# Patient Record
Sex: MALE | Race: WHITE | HISPANIC OR LATINO | Employment: FULL TIME | RURAL
[De-identification: names, ages, dates, MRNs, and addresses within clinical notes are randomized per-mention and may not be internally consistent; named-entity substitution may affect disease eponyms.]

---

## 2022-10-10 RX ORDER — SILDENAFIL 50 MG/1
TABLET, FILM COATED ORAL
Qty: 17 TABLET | Refills: 0 | OUTPATIENT
Start: 2022-10-10

## 2022-10-10 RX ORDER — FINASTERIDE 5 MG/1
TABLET, FILM COATED ORAL
Qty: 30 TABLET | Refills: 0 | OUTPATIENT
Start: 2022-10-10

## 2022-10-24 RX ORDER — FINASTERIDE 5 MG/1
TABLET, FILM COATED ORAL
Qty: 30 TABLET | Refills: 0 | OUTPATIENT
Start: 2022-10-24

## 2022-10-24 RX ORDER — SILDENAFIL 50 MG/1
TABLET, FILM COATED ORAL
Qty: 17 TABLET | Refills: 0 | OUTPATIENT
Start: 2022-10-24

## 2022-11-23 ENCOUNTER — TELEPHONE (OUTPATIENT)
Dept: FAMILY MEDICINE CLINIC | Facility: CLINIC | Age: 57
End: 2022-11-23

## 2022-11-23 NOTE — TELEPHONE ENCOUNTER
Caller: George Staley    Relationship: Self    Best call back number:    [phone]    Requested Prescriptions:     FINASTERIDE - 5 MG     SILDENAFIL - 50 MG    Pharmacy where request should be sent:      [facility] - [place], [place]    TELEPHONE CONTACT:    [phone]    Additional details provided by patient:     PATIENT STATED HE IS COMPLETELY OUT OF THE MEDICATIONS    Does the patient have less than a 3 day supply:  [x] Yes  [] No    Jed Jones Rep   11/23/22 15:36 EST     DR LEONARD DUMONT

## 2022-11-28 RX ORDER — SILDENAFIL 50 MG/1
TABLET, FILM COATED ORAL
Qty: 17 TABLET | Refills: 1 | Status: SHIPPED | OUTPATIENT
Start: 2022-11-28 | End: 2023-02-17 | Stop reason: SDUPTHER

## 2022-11-28 RX ORDER — FINASTERIDE 5 MG/1
TABLET, FILM COATED ORAL
Qty: 30 TABLET | Refills: 1 | Status: SHIPPED | OUTPATIENT
Start: 2022-11-28 | End: 2023-02-17 | Stop reason: SDUPTHER

## 2022-11-28 RX ORDER — SILDENAFIL 50 MG/1
50 TABLET, FILM COATED ORAL DAILY PRN
Qty: 17 TABLET | Refills: 1 | Status: SHIPPED | OUTPATIENT
Start: 2022-11-28 | End: 2023-02-17 | Stop reason: SDUPTHER

## 2022-11-28 RX ORDER — FINASTERIDE 5 MG/1
5 TABLET, FILM COATED ORAL DAILY
Qty: 30 TABLET | Refills: 2 | Status: SHIPPED | OUTPATIENT
Start: 2022-11-28 | End: 2023-02-17 | Stop reason: SDUPTHER

## 2022-11-28 NOTE — TELEPHONE ENCOUNTER
Patient has an upcoming appointment on 02/17/2023    Will send enough refills until that appointment

## 2023-02-17 ENCOUNTER — OFFICE VISIT (OUTPATIENT)
Dept: FAMILY MEDICINE CLINIC | Facility: CLINIC | Age: 58
End: 2023-02-17
Payer: COMMERCIAL

## 2023-02-17 VITALS
RESPIRATION RATE: 14 BRPM | DIASTOLIC BLOOD PRESSURE: 72 MMHG | WEIGHT: 180.38 LBS | BODY MASS INDEX: 31.96 KG/M2 | OXYGEN SATURATION: 99 % | HEART RATE: 77 BPM | SYSTOLIC BLOOD PRESSURE: 120 MMHG | HEIGHT: 63 IN | TEMPERATURE: 97.8 F

## 2023-02-17 DIAGNOSIS — R00.2 PALPITATION: ICD-10-CM

## 2023-02-17 DIAGNOSIS — J30.1 SEASONAL ALLERGIC RHINITIS DUE TO POLLEN: ICD-10-CM

## 2023-02-17 DIAGNOSIS — Z00.00 ROUTINE GENERAL MEDICAL EXAMINATION AT A HEALTH CARE FACILITY: Primary | ICD-10-CM

## 2023-02-17 DIAGNOSIS — Z12.5 SPECIAL SCREENING, PROSTATE CANCER: ICD-10-CM

## 2023-02-17 DIAGNOSIS — N52.9 VASCULOGENIC ERECTILE DYSFUNCTION, UNSPECIFIED VASCULOGENIC ERECTILE DYSFUNCTION TYPE: ICD-10-CM

## 2023-02-17 DIAGNOSIS — N40.0 BENIGN PROSTATIC HYPERPLASIA WITHOUT LOWER URINARY TRACT SYMPTOMS: ICD-10-CM

## 2023-02-17 DIAGNOSIS — E78.2 MIXED HYPERLIPIDEMIA: ICD-10-CM

## 2023-02-17 DIAGNOSIS — E66.09 CLASS 1 OBESITY DUE TO EXCESS CALORIES WITHOUT SERIOUS COMORBIDITY WITH BODY MASS INDEX (BMI) OF 31.0 TO 31.9 IN ADULT: ICD-10-CM

## 2023-02-17 DIAGNOSIS — D64.89 ANEMIA DUE TO OTHER CAUSE, NOT CLASSIFIED: ICD-10-CM

## 2023-02-17 PROCEDURE — 99396 PREV VISIT EST AGE 40-64: CPT | Performed by: INTERNAL MEDICINE

## 2023-02-17 PROCEDURE — 93000 ELECTROCARDIOGRAM COMPLETE: CPT | Performed by: INTERNAL MEDICINE

## 2023-02-17 PROCEDURE — 36415 COLL VENOUS BLD VENIPUNCTURE: CPT | Performed by: INTERNAL MEDICINE

## 2023-02-17 RX ORDER — TAMSULOSIN HYDROCHLORIDE 0.4 MG/1
1 CAPSULE ORAL DAILY
Qty: 30 CAPSULE | Refills: 5 | Status: SHIPPED | OUTPATIENT
Start: 2023-02-17

## 2023-02-17 RX ORDER — FINASTERIDE 5 MG/1
5 TABLET, FILM COATED ORAL DAILY
Qty: 30 TABLET | Refills: 2 | Status: SHIPPED | OUTPATIENT
Start: 2023-02-17

## 2023-02-17 RX ORDER — TAMSULOSIN HYDROCHLORIDE 0.4 MG/1
1 CAPSULE ORAL DAILY
COMMUNITY
End: 2023-02-17 | Stop reason: SDUPTHER

## 2023-02-17 RX ORDER — SILDENAFIL 50 MG/1
50 TABLET, FILM COATED ORAL AS NEEDED
Qty: 9 TABLET | Refills: 1 | Status: SHIPPED | OUTPATIENT
Start: 2023-02-17

## 2023-02-17 NOTE — ASSESSMENT & PLAN NOTE
Modest increase in weight pattern since last visit, but his BMI does overestimate his weight pattern, he appears to be at most 10 to 15 pounds overweight, he is very active and healthy.  Nonetheless reinforced importance of healthy diet, activity level.

## 2023-02-17 NOTE — ASSESSMENT & PLAN NOTE
Mild and seasonal pattern typically responsive to over-the-counter antihistamine when used.  Additional benefit of saline spray, nasal flushing.  Advise concerns.

## 2023-02-17 NOTE — ASSESSMENT & PLAN NOTE
No concerns of libido, with notable normal total testosterone of 367 on 8/31/2021.  Good response to previous older female, refill provided for sildenafil 50 mg daily as needed for sexual activity, to be given approximately 30 min. prior.  Advise if not improving.

## 2023-02-17 NOTE — PROGRESS NOTES
Venipuncture Blood Specimen Collection  Venipuncture performed in left arm by Denise Bass MA with good hemostasis. Patient tolerated the procedure well without complications.   02/17/23   Denise Bass MA

## 2023-02-17 NOTE — ASSESSMENT & PLAN NOTE
Last full blood work 8/31/2021, repeat ordered today 2/17/2023.  EGD by Dr. Hyatt 8/22/2018 showing gastric biopsy with chronic active H.  Pylori infection, and gastroesophageal junction with squamous mucosa with mild changes suggestive of GERD, negative for intestinal metaplasia.  colonoscopy 8/22/2018 notable hemorrhoids but all else is normal, 10 year followup recommended.  Childhood vaccinations given, b with Tdap given 2/4/2019.

## 2023-02-17 NOTE — ASSESSMENT & PLAN NOTE
Notable for 12/8/2017 blood work with hemoglobin 14.7 which is normal, hematocrit slightly low of 40.6 with normal range MCV 83.  Recheck with blood work 6/11/2018 including CBC with differential, hemoglobin and hematocrit 13.6/39.9, additional investigations included a normal reticulocyte count at 1.3%.  Ferritin level at 29 with range 8-388.  LDH normal at 169 with range 100-1 90.  Iron level 45 with range .  TIBC of 313 with range 250-450.  Percent saturation of 14% with range 15-55 percent.  Vitamin B12 level normal at 518 with range 193-986.  Folate of 19.5 with range 8.6-58.9.. He was referred for colonoscopy, which was reassuring on 8/22/2018 and an EGD showing chronic active H. pylori infection which was treated, GE junction with squamous mucosa but negative for intestinal metaplasia, consistent with reflux.  Reflux symptoms are resolved status post treatment of H. pylori.  Recheck blood work 8/31/2021 showed modestly improved hemoglobin 14.2, hematocrit 41.5, MCV 86.1.  Recheck pending.

## 2023-02-17 NOTE — PATIENT INSTRUCTIONS

## 2023-02-17 NOTE — ASSESSMENT & PLAN NOTE
Pattern consistent with benign prostatic hypertrophy, for which he was placed on finasteride 5 mg daily per previous physician, Dr. Horacio Watkins at [facility] in approximately 2016.  Normal PSA of 1.79 on 12/8/2017, 2.71 on 8/31/2021.  Continue finasteride 5 mg daily, Flomax 0.4 mg daily.  Recheck PSA pending.

## 2023-02-17 NOTE — ASSESSMENT & PLAN NOTE
Rare palpitation, no associated chest pain or shortness of breath with completely reassuring EKG 2/17/2023, none to compare prior.  No further evaluation necessary.

## 2023-02-17 NOTE — ASSESSMENT & PLAN NOTE
8/31/2021 total cholesterol 189, triglycerides 94, HDL 49, .  Comparison 12/8/2017 with total cholesterol 181, triglycerides 102, HDL 52, .  Modest increase, no indication for statin therapy.  Reinforced importance of healthy diet, exercise.  Recheck pending

## 2023-02-18 LAB
ALBUMIN SERPL-MCNC: 4.4 G/DL (ref 3.8–4.9)
ALBUMIN/GLOB SERPL: 2 {RATIO} (ref 1.2–2.2)
ALP SERPL-CCNC: 68 IU/L (ref 44–121)
ALT SERPL-CCNC: 24 IU/L (ref 0–44)
APPEARANCE UR: CLEAR
AST SERPL-CCNC: 17 IU/L (ref 0–40)
BACTERIA #/AREA URNS HPF: NORMAL /[HPF]
BASOPHILS # BLD AUTO: 0 X10E3/UL (ref 0–0.2)
BASOPHILS NFR BLD AUTO: 1 %
BILIRUB SERPL-MCNC: 0.6 MG/DL (ref 0–1.2)
BILIRUB UR QL STRIP: NEGATIVE
BUN SERPL-MCNC: 17 MG/DL (ref 6–24)
BUN/CREAT SERPL: 16 (ref 9–20)
CALCIUM SERPL-MCNC: 9.2 MG/DL (ref 8.7–10.2)
CASTS URNS QL MICRO: NORMAL /LPF
CHLORIDE SERPL-SCNC: 104 MMOL/L (ref 96–106)
CHOLEST SERPL-MCNC: 191 MG/DL (ref 100–199)
CO2 SERPL-SCNC: 23 MMOL/L (ref 20–29)
COLOR UR: YELLOW
CREAT SERPL-MCNC: 1.04 MG/DL (ref 0.76–1.27)
EGFRCR SERPLBLD CKD-EPI 2021: 83 ML/MIN/1.73
EOSINOPHIL # BLD AUTO: 0.1 X10E3/UL (ref 0–0.4)
EOSINOPHIL NFR BLD AUTO: 2 %
EPI CELLS #/AREA URNS HPF: NORMAL /HPF (ref 0–10)
ERYTHROCYTE [DISTWIDTH] IN BLOOD BY AUTOMATED COUNT: 12.5 % (ref 11.6–15.4)
GLOBULIN SER CALC-MCNC: 2.2 G/DL (ref 1.5–4.5)
GLUCOSE SERPL-MCNC: 99 MG/DL (ref 70–99)
GLUCOSE UR QL STRIP: NEGATIVE
HBA1C MFR BLD: 5.7 % (ref 4.8–5.6)
HCT VFR BLD AUTO: 43.4 % (ref 37.5–51)
HCV IGG SERPL QL IA: NON REACTIVE
HDLC SERPL-MCNC: 50 MG/DL
HGB BLD-MCNC: 15.3 G/DL (ref 13–17.7)
HGB UR QL STRIP: NEGATIVE
HIV 1+2 AB+HIV1 P24 AG SERPL QL IA: NON REACTIVE
IMM GRANULOCYTES # BLD AUTO: 0.1 X10E3/UL (ref 0–0.1)
IMM GRANULOCYTES NFR BLD AUTO: 1 %
KETONES UR QL STRIP: NEGATIVE
LDLC SERPL CALC-MCNC: 119 MG/DL (ref 0–99)
LEUKOCYTE ESTERASE UR QL STRIP: NEGATIVE
LYMPHOCYTES # BLD AUTO: 1.7 X10E3/UL (ref 0.7–3.1)
LYMPHOCYTES NFR BLD AUTO: 33 %
MCH RBC QN AUTO: 30.6 PG (ref 26.6–33)
MCHC RBC AUTO-ENTMCNC: 35.3 G/DL (ref 31.5–35.7)
MCV RBC AUTO: 87 FL (ref 79–97)
MICRO URNS: NORMAL
MICRO URNS: NORMAL
MONOCYTES # BLD AUTO: 0.3 X10E3/UL (ref 0.1–0.9)
MONOCYTES NFR BLD AUTO: 6 %
NEUTROPHILS # BLD AUTO: 2.9 X10E3/UL (ref 1.4–7)
NEUTROPHILS NFR BLD AUTO: 57 %
NITRITE UR QL STRIP: NEGATIVE
PH UR STRIP: 6 [PH] (ref 5–7.5)
PLATELET # BLD AUTO: 158 X10E3/UL (ref 150–450)
POTASSIUM SERPL-SCNC: 4.7 MMOL/L (ref 3.5–5.2)
PROT SERPL-MCNC: 6.6 G/DL (ref 6–8.5)
PROT UR QL STRIP: NEGATIVE
PSA SERPL-MCNC: 2.4 NG/ML (ref 0–4)
RBC # BLD AUTO: 5 X10E6/UL (ref 4.14–5.8)
RBC #/AREA URNS HPF: NORMAL /HPF (ref 0–2)
SODIUM SERPL-SCNC: 138 MMOL/L (ref 134–144)
SP GR UR STRIP: 1.02 (ref 1–1.03)
TRIGL SERPL-MCNC: 121 MG/DL (ref 0–149)
TSH SERPL DL<=0.005 MIU/L-ACNC: 1.35 UIU/ML (ref 0.45–4.5)
URINALYSIS REFLEX: NORMAL
UROBILINOGEN UR STRIP-MCNC: 0.2 MG/DL (ref 0.2–1)
VLDLC SERPL CALC-MCNC: 22 MG/DL (ref 5–40)
WBC # BLD AUTO: 5 X10E3/UL (ref 3.4–10.8)
WBC #/AREA URNS HPF: NORMAL /HPF (ref 0–5)

## 2023-02-20 ENCOUNTER — TELEPHONE (OUTPATIENT)
Dept: FAMILY MEDICINE CLINIC | Facility: CLINIC | Age: 58
End: 2023-02-20
Payer: COMMERCIAL

## 2023-02-20 NOTE — TELEPHONE ENCOUNTER
----- Message from Yeyo Lacey MD sent at 2/20/2023  8:10 AM EST -----  Please speak the patient regarding blood work results from 2/17/2023.  Notable results as follows:    Hemoglobin A1c is diabetic screen just into prediabetic range at 5.7%.  Total cholesterol 191, triglycerides 121, HDL 50, .  Comparison 8/31/2021 with total cholesterol 189, triglycerides 94, HDL 49, .  HIV and hepatitis C virus screening both negative.  PSA normal at 2.4, slightly improved and decreased from 2.71 on 8/31/2021, reassuring pattern.  TSH normal at 1.350.  CMP with normal electrolytes, notable glucose 99, kidney function with good creatinine 1.04, GFR 83.  Normal proteins, normal liver function test.  CBC with differential with normal blood counts.  Urinalysis negative and nonconcerning.    Regarding prediabetic pattern which is new diagnosis, reinforced importance of healthy diet, exercise and weight loss and recheck hemoglobin A1c in clinic at 6-month follow-up visit.  Regarding modest increase in cholesterol, similar to before, reinforcing again importance of healthy diet, exercise, weight loss.

## 2023-02-20 NOTE — TELEPHONE ENCOUNTER
Spoke to patient in regards to labs he did express understanding of no more cokes eat healthier he will see us in 6 months.

## 2023-02-27 ENCOUNTER — TELEPHONE (OUTPATIENT)
Dept: FAMILY MEDICINE CLINIC | Facility: CLINIC | Age: 58
End: 2023-02-27
Payer: COMMERCIAL

## 2023-02-27 NOTE — TELEPHONE ENCOUNTER
Caller: CLAUDE HUFFMAN     Relationship: DAUGHTER     Best call back number: [phone]    What is your medical concern? WANTS TO SPEAK TO YOU REGARDING PATIENTS LAST VISIT.02/17/23    SHE IS NOT ON A BH VERBAL  HOWEVER HE SPOKE TO ME AND WANTS YOU TO TALK WITH HIS DAUGHTER.

## 2023-06-12 DIAGNOSIS — N52.9 VASCULOGENIC ERECTILE DYSFUNCTION, UNSPECIFIED VASCULOGENIC ERECTILE DYSFUNCTION TYPE: ICD-10-CM

## 2023-06-13 RX ORDER — SILDENAFIL 50 MG/1
TABLET, FILM COATED ORAL
Qty: 9 TABLET | Refills: 1 | Status: SHIPPED | OUTPATIENT
Start: 2023-06-13

## 2023-07-21 NOTE — PROGRESS NOTES
Male Physical Note      Date: 2023   Patient Name: George Staley  : 1965   MRN: [id number]     Chief Complaint   Patient presents with   • Annual Exam       History of Present Illness: George Staley is a 57 y.o. male who is here today for their annual health maintenance and physical, and for follow-up regarding multimedical problems.  Regarding benign prostatic hypertrophy, he continues on finasteride and Flomax with benefit of nighttime urinary pattern.  Regarding erectile dysfunction with normal testosterone level, benefit of sildenafil with as needed use for sexual activity.  He overall has good sexual interest and libido. Regarding previous anemia pattern, he was referred for gastrointestinal evaluation in 2018 and had colonoscopy which is reassuring and EGD showing chronic H. pylori infection for which he was treated and has clinically had resolution of any reflux type symptoms.  Monitor with blood work.  Regarding cholesterol, we discussed need for improved diet and exercise, and it's time to recheck his cholesterol at this time.  Previous reflux symptoms have resolved after he a treatment of H. pylori in 2018.  Mild seasonal allergy symptoms do well with rare use of over-the-counter antihistamine.  Rare palpitation, no associated chest pain or shortness of breath, very transient without clear trigger, no increased use of caffeine    Subjective      Review of Systems    Past Medical History, Social History, Family History and Care Team were all reviewed with patient and updated as appropriate.       Current Outpatient Medications:   •  finasteride (Proscar) 5 MG tablet, Take 1 tablet by mouth Daily., Disp: 30 tablet, Rfl: 2  •  sildenafil (VIAGRA) 50 MG tablet, Take 1 tablet by mouth As Needed for Erectile Dysfunction., Disp: 9 tablet, Rfl: 1  •  tamsulosin (FLOMAX) 0.4 MG capsule 24 hr capsule, Take 1 capsule by mouth Daily., Disp: 30 capsule, Rfl: 5    No Known  Allergies    Immunization History   Administered Date(s) Administered   • COVID-19 (PFIZER) PURPLE CAP 08/31/2021, 09/21/2021   • Tdap 02/04/2019        Health Maintenance Summary          Overdue - ZOSTER VACCINE (1 of 2) Overdue - never done    No completion, postpone, or frequency change history exists for this topic.          Overdue - COVID-19 Vaccine (3 - Booster for Pfizer series) Overdue since 11/16/2021 09/21/2021  Imm Admin: COVID-19 (PFIZER) PURPLE CAP    08/31/2021  Imm Admin: COVID-19 (PFIZER) PURPLE CAP          Overdue - INFLUENZA VACCINE (Yearly - August to March) Overdue - never done    No completion, postpone, or frequency change history exists for this topic.          Ordered - HEPATITIS C SCREENING (Once) Ordered on 2/17/2023    No completion, postpone, or frequency change history exists for this topic.          Overdue - ANNUAL PHYSICAL (Yearly) Overdue - never done    No completion, postpone, or frequency change history exists for this topic.          Ordered - LIPID PANEL (Yearly) Ordered on 2/17/2023    No completion, postpone, or frequency change history exists for this topic.          COLORECTAL CANCER SCREENING (COLONOSCOPY - Every 10 Years) Next due on 8/22/2028 08/22/2018  COLONOSCOPY (Done - 8/22/2018 colonoscopy by Dr. Hyatt with hemorrhoids but otherwise negative, 10-year follow-up recommended)          TDAP/TD VACCINES (2 - Td or Tdap) Next due on 2/4/2029 02/04/2019  Imm Admin: Tdap          Pneumococcal Vaccine 0-64 (Series Information) Aged Out    No completion, postpone, or frequency change history exists for this topic.                Colorectal Screening:   Colonoscopy 8/22/2018 with 10-year follow-up  Last Completed Colonoscopy          COLORECTAL CANCER SCREENING (COLONOSCOPY - Every 10 Years) Next due on 8/22/2028 08/22/2018  COLONOSCOPY (Done - 8/22/2018 colonoscopy by Dr. Hyatt with hemorrhoids but otherwise negative, 10-year follow-up recommended)               CT for Smoker (Age 50-80, 20pk yr within last 15 years): Not applicable  Hep C (Age 18-79 once): Pending with blood work  HIV (Age 15-65 once) : Pending with blood work  PSA (Over age 50, C Level Recommendation): No results found for: PSA  US Aorta (For male smokers, age 65): Not applicable  A1c: No results found for: HGBA1C  Lipid panel: No results found for: LIPIDEXCLUSI    The ASCVD Risk score (Deondre WOMACK, et al., 2019) failed to calculate for the following reasons:    Cannot find a previous HDL lab    Cannot find a previous total cholesterol lab      Tobacco Use: Low Risk    • Smoking Tobacco Use: Never   • Smokeless Tobacco Use: Never   • Passive Exposure: Not on file       Social History     Substance and Sexual Activity   Alcohol Use Never        Social History     Substance and Sexual Activity   Drug Use Never        Diet/Physical activity: Ongoing attempts to improve dietary intake with still good activity level through work    Sexual health:  Social History     Substance and Sexual Activity   Sexual Activity Yes       PHQ-2 Depression Screening  PHQ-9 Total Score: 0       Intimate partner violence: (Screen on initial visit, older adult with injury or evidence of neglect):  • Violence can be a problem in many people's lives, so I now ask every patient about trauma or abuse they may have experienced in a relationship.  • Stress/Safety - Do you feel safe in your relationship?  • Afraid/Abused - Have you ever been in a relationship where you were threatened, hurt, or afraid?  • Friend/Family - Are your friends aware you have been hurt?  • Emergency Plan - Do you have a safe place to go and the resources you need in an emergency?    Osteoporosis:   • Men: history of low trauma fracture, androgen deprivation therapy for prostate cancer, hypogonadism, primary hyperparathyroidism, intestinal disorders.     Objective     Physical Exam:  Vitals:    02/17/23 0921   BP: 120/72   BP Location: Right arm  "  Patient Position: Sitting   Cuff Size: Adult   Pulse: 77   Resp: 14   Temp: 97.8 °F (36.6 °C)   TempSrc: Temporal   SpO2: 99%   Weight: 81.8 kg (180 lb 6 oz)   Height: 160 cm (63\")     Body mass index is 31.95 kg/m².     Physical Exam  Constitutional:       General: He is not in acute distress.     Appearance: Normal appearance. He is obese. He is not ill-appearing, toxic-appearing or diaphoretic.   HENT:      Head: Normocephalic and atraumatic.      Right Ear: Tympanic membrane, ear canal and external ear normal.      Left Ear: Tympanic membrane, ear canal and external ear normal.      Nose: Nose normal. No rhinorrhea.      Mouth/Throat:      Mouth: Mucous membranes are moist.      Pharynx: Oropharynx is clear. No oropharyngeal exudate or posterior oropharyngeal erythema.   Eyes:      Extraocular Movements: Extraocular movements intact.      Conjunctiva/sclera: Conjunctivae normal.      Pupils: Pupils are equal, round, and reactive to light.   Neck:      Vascular: No carotid bruit.   Cardiovascular:      Rate and Rhythm: Normal rate and regular rhythm.      Pulses: Normal pulses.      Heart sounds: Normal heart sounds. No murmur heard.    No friction rub. No gallop.   Pulmonary:      Effort: Pulmonary effort is normal. No respiratory distress.      Breath sounds: Normal breath sounds. No stridor. No wheezing.   Abdominal:      General: Abdomen is flat. Bowel sounds are normal. There is no distension.      Palpations: Abdomen is soft. There is no mass.      Tenderness: There is no abdominal tenderness. There is no guarding or rebound.      Hernia: No hernia is present.   Genitourinary:     Penis: Normal.       Testes: Normal.      Prostate: Normal.      Rectum: Normal.      Comments: Full mildly enlarged prostate but smooth with no nodules or unusual palpation.  Normal rectal tone.  Negative hernia evaluation bilaterally.  Musculoskeletal:      Cervical back: Normal range of motion and neck supple. No tenderness. "      Right lower leg: No edema.      Left lower leg: No edema.   Lymphadenopathy:      Cervical: No cervical adenopathy.   Skin:     General: Skin is warm and dry.      Capillary Refill: Capillary refill takes less than 2 seconds.   Neurological:      General: No focal deficit present.      Mental Status: He is alert and oriented to person, place, and time. Mental status is at baseline.   Psychiatric:         Mood and Affect: Mood normal.         Behavior: Behavior normal.         Thought Content: Thought content normal.         Judgment: Judgment normal.           ECG 12 Lead    Date/Time: 2/17/2023 9:50 AM  Performed by: Yeyo Lacey MD  Authorized by: Yeyo Lacey MD   Comparison: not compared with previous ECG   Rhythm: sinus rhythm  Rate: normal  Conduction: conduction normal  QRS axis: normal  Other: no other findings    Clinical impression: normal ECG            Assessment / Plan      Assessment/Plan:   Diagnoses and all orders for this visit:    1. Routine general medical examination at a health care facility (Primary)  Assessment & Plan:  Last full blood work 8/31/2021, repeat ordered today 2/17/2023.  EGD by Dr. Hyatt 8/22/2018 showing gastric biopsy with chronic active H.  Pylori infection, and gastroesophageal junction with squamous mucosa with mild changes suggestive of GERD, negative for intestinal metaplasia.  colonoscopy 8/22/2018 notable hemorrhoids but all else is normal, 10 year followup recommended.  Childhood vaccinations given, b with Tdap given 2/4/2019.    Orders:  -     CBC & Differential; Future  -     Comprehensive Metabolic Panel; Future  -     Urinalysis With Culture If Indicated - Urine, Clean Catch; Future  -     Lipid Panel; Future  -     TSH Rfx On Abnormal To Free T4; Future  -     Hemoglobin A1c; Future  -     Hepatitis C Antibody; Future  -     HIV-1 / O / 2 Ag / Antibody 4th Generation; Future  -     HIV-1 / O / 2 Ag / Antibody 4th Generation  -     Hepatitis C Antibody  -      Hemoglobin A1c  -     TSH Rfx On Abnormal To Free T4  -     Lipid Panel  -     Urinalysis With Culture If Indicated - Urine, Clean Catch  -     Comprehensive Metabolic Panel  -     CBC & Differential    2. Seasonal allergic rhinitis due to pollen  Assessment & Plan:  Mild and seasonal pattern typically responsive to over-the-counter antihistamine when used.  Additional benefit of saline spray, nasal flushing.  Advise concerns.      3. Class 1 obesity due to excess calories without serious comorbidity with body mass index (BMI) of 31.0 to 31.9 in adult  Assessment & Plan:  Modest increase in weight pattern since last visit, but his BMI does overestimate his weight pattern, he appears to be at most 10 to 15 pounds overweight, he is very active and healthy.  Nonetheless reinforced importance of healthy diet, activity level.      4. Anemia due to other cause, not classified  Assessment & Plan:  Notable for 12/8/2017 blood work with hemoglobin 14.7 which is normal, hematocrit slightly low of 40.6 with normal range MCV 83.  Recheck with blood work 6/11/2018 including CBC with differential, hemoglobin and hematocrit 13.6/39.9, additional investigations included a normal reticulocyte count at 1.3%.  Ferritin level at 29 with range 8-388.  LDH normal at 169 with range 100-1 90.  Iron level 45 with range .  TIBC of 313 with range 250-450.  Percent saturation of 14% with range 15-55 percent.  Vitamin B12 level normal at 518 with range 193-986.  Folate of 19.5 with range 8.6-58.9.. He was referred for colonoscopy, which was reassuring on 8/22/2018 and an EGD showing chronic active H. pylori infection which was treated, GE junction with squamous mucosa but negative for intestinal metaplasia, consistent with reflux.  Reflux symptoms are resolved status post treatment of H. pylori.  Recheck blood work 8/31/2021 showed modestly improved hemoglobin 14.2, hematocrit 41.5, MCV 86.1.  Recheck pending.      5. Mixed  hyperlipidemia  Assessment & Plan:   8/31/2021 total cholesterol 189, triglycerides 94, HDL 49, .  Comparison 12/8/2017 with total cholesterol 181, triglycerides 102, HDL 52, .  Modest increase, no indication for statin therapy.  Reinforced importance of healthy diet, exercise.  Recheck pending      6. Vasculogenic erectile dysfunction, unspecified vasculogenic erectile dysfunction type  Assessment & Plan:  No concerns of libido, with notable normal total testosterone of 367 on 8/31/2021.  Good response to previous older female, refill provided for sildenafil 50 mg daily as needed for sexual activity, to be given approximately 30 min. prior.  Advise if not improving.    Orders:  -     sildenafil (VIAGRA) 50 MG tablet; Take 1 tablet by mouth As Needed for Erectile Dysfunction.  Dispense: 9 tablet; Refill: 1    7. Special screening, prostate cancer  -     PSA Screen; Future  -     PSA Screen    8. Palpitation  Assessment & Plan:  Rare palpitation, no associated chest pain or shortness of breath with completely reassuring EKG 2/17/2023, none to compare prior.  No further evaluation necessary.    Orders:  -     ECG 12 Lead    9. Benign prostatic hyperplasia without lower urinary tract symptoms  Assessment & Plan:  Pattern consistent with benign prostatic hypertrophy, for which he was placed on finasteride 5 mg daily per previous physician, Dr. Horacio Watkins at [facility] in approximately 2016.  Normal PSA of 1.79 on 12/8/2017, 2.71 on 8/31/2021.  Continue finasteride 5 mg daily, Flomax 0.4 mg daily.  Recheck PSA pending.    Orders:  -     finasteride (Proscar) 5 MG tablet; Take 1 tablet by mouth Daily.  Dispense: 30 tablet; Refill: 2  -     tamsulosin (FLOMAX) 0.4 MG capsule 24 hr capsule; Take 1 capsule by mouth Daily.  Dispense: 30 capsule; Refill: 5     Healthcare Maintenance:  Counseling provided based on age appropriate USPSTF guidelines.  BMI is >= 30 and <35.  (Class 1 Obesity). The following options were offered after discussion;: weight loss educational material (shared in after visit summary), exercise counseling/recommendations and nutrition counseling/recommendations      George Staley voices understanding and acceptance of this advice and will call back with any further questions or concerns. AVS with preventive healthcare tips printed for patient.     Follow Up:   Return in about 6 months (around 8/17/2023) for Next scheduled follow up.        Yeyo Lacey MD  [facility]   Patient requests all Lab, Cardiology, and Radiology Results on their Discharge Instructions

## 2023-08-02 DIAGNOSIS — N40.0 BENIGN PROSTATIC HYPERPLASIA WITHOUT LOWER URINARY TRACT SYMPTOMS: ICD-10-CM

## 2023-08-02 DIAGNOSIS — N52.9 VASCULOGENIC ERECTILE DYSFUNCTION, UNSPECIFIED VASCULOGENIC ERECTILE DYSFUNCTION TYPE: ICD-10-CM

## 2023-08-03 RX ORDER — TAMSULOSIN HYDROCHLORIDE 0.4 MG/1
1 CAPSULE ORAL DAILY
Qty: 30 CAPSULE | Refills: 3 | Status: SHIPPED | OUTPATIENT
Start: 2023-08-03

## 2023-08-03 RX ORDER — SILDENAFIL 50 MG/1
TABLET, FILM COATED ORAL
Qty: 9 TABLET | Refills: 1 | Status: SHIPPED | OUTPATIENT
Start: 2023-08-03

## 2023-08-03 RX ORDER — FINASTERIDE 5 MG/1
TABLET, FILM COATED ORAL
Qty: 30 TABLET | Refills: 3 | Status: SHIPPED | OUTPATIENT
Start: 2023-08-03

## 2023-08-18 ENCOUNTER — OFFICE VISIT (OUTPATIENT)
Dept: FAMILY MEDICINE CLINIC | Facility: CLINIC | Age: 58
End: 2023-08-18
Payer: COMMERCIAL

## 2023-08-18 VITALS
DIASTOLIC BLOOD PRESSURE: 70 MMHG | BODY MASS INDEX: 30.9 KG/M2 | RESPIRATION RATE: 18 BRPM | WEIGHT: 174.38 LBS | SYSTOLIC BLOOD PRESSURE: 110 MMHG | TEMPERATURE: 97 F | OXYGEN SATURATION: 99 % | HEIGHT: 63 IN

## 2023-08-18 DIAGNOSIS — R73.03 PREDIABETES: Primary | ICD-10-CM

## 2023-08-18 DIAGNOSIS — N52.9 VASCULOGENIC ERECTILE DYSFUNCTION, UNSPECIFIED VASCULOGENIC ERECTILE DYSFUNCTION TYPE: ICD-10-CM

## 2023-08-18 DIAGNOSIS — E78.2 MIXED HYPERLIPIDEMIA: ICD-10-CM

## 2023-08-18 DIAGNOSIS — E66.09 CLASS 1 OBESITY DUE TO EXCESS CALORIES WITHOUT SERIOUS COMORBIDITY WITH BODY MASS INDEX (BMI) OF 31.0 TO 31.9 IN ADULT: ICD-10-CM

## 2023-08-18 DIAGNOSIS — N40.0 BENIGN PROSTATIC HYPERPLASIA WITHOUT LOWER URINARY TRACT SYMPTOMS: ICD-10-CM

## 2023-08-18 DIAGNOSIS — J30.1 SEASONAL ALLERGIC RHINITIS DUE TO POLLEN: ICD-10-CM

## 2023-08-18 LAB
EXPIRATION DATE: NORMAL
GLUCOSE BLDC GLUCOMTR-MCNC: 132 MG/DL (ref 70–130)
HBA1C MFR BLD: 5.6 %
Lab: NORMAL

## 2023-08-18 RX ORDER — SILDENAFIL 100 MG/1
100 TABLET, FILM COATED ORAL DAILY PRN
Qty: 9 TABLET | Refills: 1 | Status: SHIPPED | OUTPATIENT
Start: 2023-08-18

## 2023-08-18 NOTE — ASSESSMENT & PLAN NOTE
New diagnosis with blood work 2/17/2023 with hemoglobin A1c of 5.7%.  With patient improving diet, less sugar intake, and 6 pound weight loss, hemoglobin A1c today improved to 5.6%.  Continue importance of healthy diet, exercise and weight loss.  We will continue to monitor every 6 months.  He is aware of increased thirst or urination a sign of progression to diabetes.

## 2023-08-18 NOTE — ASSESSMENT & PLAN NOTE
Weight loss of 6 pounds since his last visit with new prediabetic diagnosis with healthier dietary intake and activity level.  Continue unchanged, ongoing benefits discussed regarding further weight loss.

## 2023-08-18 NOTE — PROGRESS NOTES
Follow Up Office Visit      Date: 2023   Patient Name: George Staley  : 1965   MRN: [id number]     Chief Complaint:    Chief Complaint   Patient presents with    Follow-up       History of Present Illness: George Staley is a 58 y.o. male who is here today to follow up regarding multimedical problems.  Regarding new prediabetic diagnosis that hemoglobin A1c of 5.7% with blood work 2023, he is made efforts to eat healthier, less sugar in his diet, just generally less volume and has had 6 pound weight loss.  No polyuria polydipsia.  Regarding his erectile dysfunction, Viagra 50 mg daily used to work well but he has noticed it was not as efficacious and a couple times he had used 2 tablets and that seemed to do better.  He was wondering if that would be an appropriate increase.  No sense of lightheadedness when he used that dosing.  Regarding benign prostatic hypertrophy he still has good benefit on his Flomax and finasteride, with notably good PSA at 2023 blood work.  Regarding obesity and hyperlipidemia pattern, as noted above he is made efforts to eat healthier and be more active, we will plan to recheck his blood work next visit with his physical.    Subjective      Review of Systems:   Review of Systems    I have reviewed the patients family history, social history, past medical history, past surgical history and have updated it as appropriate.     Medications:     Current Outpatient Medications:     finasteride (PROSCAR) 5 MG tablet, Take 1 tablet by mouth once daily, Disp: 30 tablet, Rfl: 3    tamsulosin (FLOMAX) 0.4 MG capsule 24 hr capsule, Take 1 capsule by mouth once daily, Disp: 30 capsule, Rfl: 3    sildenafil (VIAGRA) 100 MG tablet, Take 1 tablet by mouth Daily As Needed for Erectile Dysfunction., Disp: 9 tablet, Rfl: 1    Allergies:   No Known Allergies    Objective     Physical Exam: Please see above  Vital Signs:   Vitals:    23 1533   BP: 110/70   BP Location: Left  "arm   Patient Position: Sitting   Cuff Size: Adult   Resp: 18   Temp: 97 øF (36.1 øC)   TempSrc: Temporal   SpO2: 99%   Weight: 79.1 kg (174 lb 6 oz)   Height: 160 cm (63\")     Body mass index is 30.89 kg/mý.    Physical Exam  Constitutional:       General: He is not in acute distress.     Appearance: Normal appearance. He is not ill-appearing, toxic-appearing or diaphoretic.   HENT:      Right Ear: Tympanic membrane, ear canal and external ear normal.      Left Ear: Tympanic membrane, ear canal and external ear normal.      Nose: Nose normal. No rhinorrhea.      Mouth/Throat:      Mouth: Mucous membranes are moist.      Pharynx: Oropharynx is clear. No oropharyngeal exudate or posterior oropharyngeal erythema.   Neck:      Vascular: No carotid bruit.   Cardiovascular:      Rate and Rhythm: Normal rate and regular rhythm.      Pulses: Normal pulses.      Heart sounds: Normal heart sounds. No murmur heard.    No friction rub. No gallop.   Pulmonary:      Effort: Pulmonary effort is normal. No respiratory distress.      Breath sounds: Normal breath sounds. No stridor. No wheezing.   Abdominal:      General: Abdomen is flat. Bowel sounds are normal. There is no distension.      Palpations: Abdomen is soft. There is no mass.      Tenderness: There is no abdominal tenderness. There is no guarding or rebound.      Hernia: No hernia is present.   Musculoskeletal:      Cervical back: Neck supple. No tenderness.      Right lower leg: No edema.      Left lower leg: No edema.   Lymphadenopathy:      Cervical: No cervical adenopathy.   Skin:     General: Skin is warm and dry.      Capillary Refill: Capillary refill takes less than 2 seconds.   Neurological:      General: No focal deficit present.      Mental Status: He is alert and oriented to person, place, and time. Mental status is at baseline.   Psychiatric:         Mood and Affect: Mood normal.         Behavior: Behavior normal.         Thought Content: Thought content " normal.       Procedures    Results:   Labs:   Hemoglobin A1C   Date Value Ref Range Status   08/18/2023 5.6 % Final     TSH   Date Value Ref Range Status   02/17/2023 1.350 0.450 - 4.500 uIU/mL Final        Imaging:   No valid procedures specified.          Assessment / Plan      Assessment/Plan:   Diagnoses and all orders for this visit:    1. Prediabetes (Primary)  Assessment & Plan:  New diagnosis with blood work 2/17/2023 with hemoglobin A1c of 5.7%.  With patient improving diet, less sugar intake, and 6 pound weight loss, hemoglobin A1c today improved to 5.6%.  Continue importance of healthy diet, exercise and weight loss.  We will continue to monitor every 6 months.  He is aware of increased thirst or urination a sign of progression to diabetes.    Orders:  -     POC Glycosylated Hemoglobin (Hb A1C)  -     POC Glucose    2. Vasculogenic erectile dysfunction, unspecified vasculogenic erectile dysfunction type  Assessment & Plan:  No concerns of libido, with notable normal total testosterone of 367 on 8/31/2021.  Good response previously to sildenafil 50 mg daily as needed for sexual activity, to be given approximately 30 min. prior.  Nonetheless he is found it has not worked quite as well in a couple times he is used 2 tablets and he feels that works notably better.  We will increase to 100 mg dosing, although I have cautioned hypotension.  Advise concerns.    Orders:  -     sildenafil (VIAGRA) 100 MG tablet; Take 1 tablet by mouth Daily As Needed for Erectile Dysfunction.  Dispense: 9 tablet; Refill: 1    3. Benign prostatic hyperplasia without lower urinary tract symptoms  Assessment & Plan:  Pattern consistent with benign prostatic hypertrophy, for which he was placed on finasteride 5 mg daily per previous physician, Dr. Horacio Watkins at [facility] in [facility] in approximately 2016.  Normal PSA of 1.79 on 12/8/2017, 2.71 on 8/31/2021.  Continue finasteride 5 mg daily, Flomax 0.4 mg  daily.  PSA on 2/17/2023 normal at 2.4, slightly improved and decreased from 2.71 on 8/31/2021, reassuring pattern.      4. Class 1 obesity due to excess calories without serious comorbidity with body mass index (BMI) of 31.0 to 31.9 in adult  Assessment & Plan:  Weight loss of 6 pounds since his last visit with new prediabetic diagnosis with healthier dietary intake and activity level.  Continue unchanged, ongoing benefits discussed regarding further weight loss.      5. Mixed hyperlipidemia  Assessment & Plan:  2/17/2021 total cholesterol 191, triglycerides 121, HDL 50, .  Comparison 8/31/2021 with total cholesterol 189, triglycerides 94, HDL 49, , 12/8/2017 with total cholesterol 181, triglycerides 102, HDL 52, .  Modest increase, no indication for statin therapy, although with prediabetic diagnosis, lower threshold for the future.  Reinforced importance of healthy diet, exercise.  Monitor yearly.      6. Seasonal allergic rhinitis due to pollen  Assessment & Plan:  Seasonal pattern, more spring and fall, typically uses over-the-counter antihistamine with benefit.  We could add nasal steroid in future for any breakthrough symptoms.  Additional benefit of saline spray, nasal flushing.  Advise concerns.          Follow Up:   Return in about 6 months (around 2/18/2024) for Annual physical.        Yeyo Lacey MD  [facility]

## 2023-08-18 NOTE — ASSESSMENT & PLAN NOTE
No concerns of libido, with notable normal total testosterone of 367 on 8/31/2021.  Good response previously to sildenafil 50 mg daily as needed for sexual activity, to be given approximately 30 min. prior.  Nonetheless he is found it has not worked quite as well in a couple times he is used 2 tablets and he feels that works notably better.  We will increase to 100 mg dosing, although I have cautioned hypotension.  Advise concerns.

## 2023-08-18 NOTE — ASSESSMENT & PLAN NOTE
2/17/2021 total cholesterol 191, triglycerides 121, HDL 50, .  Comparison 8/31/2021 with total cholesterol 189, triglycerides 94, HDL 49, , 12/8/2017 with total cholesterol 181, triglycerides 102, HDL 52, .  Modest increase, no indication for statin therapy, although with prediabetic diagnosis, lower threshold for the future.  Reinforced importance of healthy diet, exercise.  Monitor yearly.

## 2023-08-18 NOTE — ASSESSMENT & PLAN NOTE
Notable for 12/8/2017 blood work with hemoglobin 14.7 which is normal, hematocrit slightly low of 40.6 with normal range MCV 83.  Recheck with blood work 6/11/2018 including CBC with differential, hemoglobin and hematocrit 13.6/39.9, additional investigations included a normal reticulocyte count at 1.3%.  Ferritin level at 29 with range 8-388.  LDH normal at 169 with range 100-1 90.  Iron level 45 with range .  TIBC of 313 with range 250-450.  Percent saturation of 14% with range 15-55 percent.  Vitamin B12 level normal at 518 with range 193-986.  Folate of 19.5 with range 8.6-58.9. He was referred for colonoscopy, which was reassuring on 8/22/2018 and an EGD showing chronic active H. pylori infection which was treated, GE junction with squamous mucosa but negative for intestinal metaplasia, consistent with reflux.  Reflux symptoms are resolved status post treatment of H. pylori.  I was going blood work 8/31/2021 showed modestly improved hemoglobin 14.2, hematocrit 41.5, MCV 86.1, and completely normalized blood counts with 2/17/2023 blood work.  No further concerns.

## 2023-08-18 NOTE — ASSESSMENT & PLAN NOTE
Seasonal pattern, more spring and fall, typically uses over-the-counter antihistamine with benefit.  We could add nasal steroid in future for any breakthrough symptoms.  Additional benefit of saline spray, nasal flushing.  Advise concerns.

## 2023-10-27 DIAGNOSIS — N52.9 VASCULOGENIC ERECTILE DYSFUNCTION, UNSPECIFIED VASCULOGENIC ERECTILE DYSFUNCTION TYPE: ICD-10-CM

## 2023-10-27 RX ORDER — SILDENAFIL 100 MG/1
100 TABLET, FILM COATED ORAL DAILY PRN
Qty: 9 TABLET | Refills: 1 | Status: SHIPPED | OUTPATIENT
Start: 2023-10-27

## 2023-12-21 DIAGNOSIS — N52.9 VASCULOGENIC ERECTILE DYSFUNCTION, UNSPECIFIED VASCULOGENIC ERECTILE DYSFUNCTION TYPE: ICD-10-CM

## 2023-12-22 RX ORDER — SILDENAFIL 100 MG/1
100 TABLET, FILM COATED ORAL DAILY PRN
Qty: 9 TABLET | Refills: 0 | Status: SHIPPED | OUTPATIENT
Start: 2023-12-22

## 2024-01-15 DIAGNOSIS — N52.9 VASCULOGENIC ERECTILE DYSFUNCTION, UNSPECIFIED VASCULOGENIC ERECTILE DYSFUNCTION TYPE: ICD-10-CM

## 2024-01-15 DIAGNOSIS — N40.0 BENIGN PROSTATIC HYPERPLASIA WITHOUT LOWER URINARY TRACT SYMPTOMS: ICD-10-CM

## 2024-01-15 RX ORDER — SILDENAFIL 100 MG/1
100 TABLET, FILM COATED ORAL DAILY PRN
Qty: 9 TABLET | Refills: 0 | Status: SHIPPED | OUTPATIENT
Start: 2024-01-15

## 2024-01-15 RX ORDER — FINASTERIDE 5 MG/1
TABLET, FILM COATED ORAL
Qty: 30 TABLET | Refills: 0 | Status: SHIPPED | OUTPATIENT
Start: 2024-01-15

## 2024-02-16 ENCOUNTER — OFFICE VISIT (OUTPATIENT)
Dept: FAMILY MEDICINE CLINIC | Facility: CLINIC | Age: 59
End: 2024-02-16
Payer: COMMERCIAL

## 2024-02-16 VITALS
OXYGEN SATURATION: 98 % | WEIGHT: 176.6 LBS | BODY MASS INDEX: 31.29 KG/M2 | RESPIRATION RATE: 18 BRPM | SYSTOLIC BLOOD PRESSURE: 122 MMHG | TEMPERATURE: 98.4 F | HEIGHT: 63 IN | HEART RATE: 73 BPM | DIASTOLIC BLOOD PRESSURE: 80 MMHG

## 2024-02-16 DIAGNOSIS — D64.89 ANEMIA DUE TO OTHER CAUSE, NOT CLASSIFIED: ICD-10-CM

## 2024-02-16 DIAGNOSIS — E78.2 MIXED HYPERLIPIDEMIA: ICD-10-CM

## 2024-02-16 DIAGNOSIS — Z00.00 ROUTINE GENERAL MEDICAL EXAMINATION AT A HEALTH CARE FACILITY: Primary | ICD-10-CM

## 2024-02-16 DIAGNOSIS — J30.1 SEASONAL ALLERGIC RHINITIS DUE TO POLLEN: ICD-10-CM

## 2024-02-16 DIAGNOSIS — N52.9 VASCULOGENIC ERECTILE DYSFUNCTION, UNSPECIFIED VASCULOGENIC ERECTILE DYSFUNCTION TYPE: ICD-10-CM

## 2024-02-16 DIAGNOSIS — Z12.5 SPECIAL SCREENING, PROSTATE CANCER: ICD-10-CM

## 2024-02-16 DIAGNOSIS — E66.09 CLASS 1 OBESITY DUE TO EXCESS CALORIES WITHOUT SERIOUS COMORBIDITY WITH BODY MASS INDEX (BMI) OF 31.0 TO 31.9 IN ADULT: ICD-10-CM

## 2024-02-16 DIAGNOSIS — N40.0 BENIGN PROSTATIC HYPERPLASIA WITHOUT LOWER URINARY TRACT SYMPTOMS: ICD-10-CM

## 2024-02-16 DIAGNOSIS — R73.03 PREDIABETES: ICD-10-CM

## 2024-02-16 LAB
EXPIRATION DATE: NORMAL
EXPIRATION DATE: NORMAL
GLUCOSE BLDC GLUCOMTR-MCNC: 85 MG/DL (ref 70–130)
HBA1C MFR BLD: 5.5 % (ref 4.5–5.7)
Lab: NORMAL
Lab: NORMAL

## 2024-02-16 RX ORDER — FINASTERIDE 5 MG/1
5 TABLET, FILM COATED ORAL DAILY
Qty: 90 TABLET | Refills: 1 | Status: SHIPPED | OUTPATIENT
Start: 2024-02-16

## 2024-02-16 RX ORDER — TAMSULOSIN HYDROCHLORIDE 0.4 MG/1
1 CAPSULE ORAL DAILY
Qty: 90 CAPSULE | Refills: 1 | Status: SHIPPED | OUTPATIENT
Start: 2024-02-16

## 2024-02-16 RX ORDER — CETIRIZINE HYDROCHLORIDE 10 MG/1
10 TABLET ORAL DAILY
Qty: 30 TABLET | Refills: 3 | Status: SHIPPED | OUTPATIENT
Start: 2024-02-16

## 2024-02-16 RX ORDER — FLUTICASONE PROPIONATE 50 MCG
2 SPRAY, SUSPENSION (ML) NASAL DAILY
Qty: 15.8 ML | Refills: 3 | Status: SHIPPED | OUTPATIENT
Start: 2024-02-16

## 2024-02-16 RX ORDER — SILDENAFIL 100 MG/1
100 TABLET, FILM COATED ORAL DAILY PRN
Qty: 9 TABLET | Refills: 1 | Status: SHIPPED | OUTPATIENT
Start: 2024-02-16

## 2024-02-28 ENCOUNTER — CLINICAL SUPPORT (OUTPATIENT)
Dept: FAMILY MEDICINE CLINIC | Facility: CLINIC | Age: 59
End: 2024-02-28
Payer: COMMERCIAL

## 2024-02-28 DIAGNOSIS — Z00.00 ROUTINE GENERAL MEDICAL EXAMINATION AT A HEALTH CARE FACILITY: ICD-10-CM

## 2024-02-28 DIAGNOSIS — Z12.5 SPECIAL SCREENING, PROSTATE CANCER: ICD-10-CM

## 2024-02-28 PROCEDURE — 36415 COLL VENOUS BLD VENIPUNCTURE: CPT | Performed by: INTERNAL MEDICINE

## 2024-02-28 NOTE — PROGRESS NOTES
Venipuncture Blood Specimen Collection  Venipuncture performed in left arm by Destiny Villegas MA with good hemostasis. Patient tolerated the procedure well without complications.   02/28/24   Destiny Villegas MA

## 2024-02-29 ENCOUNTER — TELEPHONE (OUTPATIENT)
Dept: FAMILY MEDICINE CLINIC | Facility: CLINIC | Age: 59
End: 2024-02-29
Payer: COMMERCIAL

## 2024-02-29 DIAGNOSIS — R94.5 ABNORMAL LIVER FUNCTION: Primary | ICD-10-CM

## 2024-02-29 LAB
ALBUMIN SERPL-MCNC: 4.8 G/DL (ref 3.8–4.9)
ALBUMIN/GLOB SERPL: 2 {RATIO} (ref 1.2–2.2)
ALP SERPL-CCNC: 90 IU/L (ref 44–121)
ALT SERPL-CCNC: 54 IU/L (ref 0–44)
APPEARANCE UR: CLEAR
AST SERPL-CCNC: 31 IU/L (ref 0–40)
BACTERIA #/AREA URNS HPF: NORMAL /[HPF]
BASOPHILS # BLD AUTO: 0 X10E3/UL (ref 0–0.2)
BASOPHILS NFR BLD AUTO: 1 %
BILIRUB SERPL-MCNC: 0.4 MG/DL (ref 0–1.2)
BILIRUB UR QL STRIP: NEGATIVE
BUN SERPL-MCNC: 17 MG/DL (ref 6–24)
BUN/CREAT SERPL: 17 (ref 9–20)
CALCIUM SERPL-MCNC: 9.3 MG/DL (ref 8.7–10.2)
CASTS URNS QL MICRO: NORMAL /LPF
CHLORIDE SERPL-SCNC: 101 MMOL/L (ref 96–106)
CHOLEST SERPL-MCNC: 189 MG/DL (ref 100–199)
CO2 SERPL-SCNC: 23 MMOL/L (ref 20–29)
COLOR UR: YELLOW
CREAT SERPL-MCNC: 0.99 MG/DL (ref 0.76–1.27)
EGFRCR SERPLBLD CKD-EPI 2021: 88 ML/MIN/1.73
EOSINOPHIL # BLD AUTO: 0.1 X10E3/UL (ref 0–0.4)
EOSINOPHIL NFR BLD AUTO: 2 %
EPI CELLS #/AREA URNS HPF: NORMAL /HPF (ref 0–10)
ERYTHROCYTE [DISTWIDTH] IN BLOOD BY AUTOMATED COUNT: 12.1 % (ref 11.6–15.4)
GLOBULIN SER CALC-MCNC: 2.4 G/DL (ref 1.5–4.5)
GLUCOSE SERPL-MCNC: 95 MG/DL (ref 70–99)
GLUCOSE UR QL STRIP: NEGATIVE
HCT VFR BLD AUTO: 44.1 % (ref 37.5–51)
HDLC SERPL-MCNC: 52 MG/DL
HGB BLD-MCNC: 14.8 G/DL (ref 13–17.7)
HGB UR QL STRIP: NEGATIVE
IMM GRANULOCYTES # BLD AUTO: 0 X10E3/UL (ref 0–0.1)
IMM GRANULOCYTES NFR BLD AUTO: 0 %
KETONES UR QL STRIP: NEGATIVE
LDLC SERPL CALC-MCNC: 116 MG/DL (ref 0–99)
LEUKOCYTE ESTERASE UR QL STRIP: NEGATIVE
LYMPHOCYTES # BLD AUTO: 1.7 X10E3/UL (ref 0.7–3.1)
LYMPHOCYTES NFR BLD AUTO: 35 %
MCH RBC QN AUTO: 28.9 PG (ref 26.6–33)
MCHC RBC AUTO-ENTMCNC: 33.6 G/DL (ref 31.5–35.7)
MCV RBC AUTO: 86 FL (ref 79–97)
MICRO URNS: NORMAL
MICRO URNS: NORMAL
MONOCYTES # BLD AUTO: 0.4 X10E3/UL (ref 0.1–0.9)
MONOCYTES NFR BLD AUTO: 7 %
NEUTROPHILS # BLD AUTO: 2.7 X10E3/UL (ref 1.4–7)
NEUTROPHILS NFR BLD AUTO: 55 %
NITRITE UR QL STRIP: NEGATIVE
PH UR STRIP: 5.5 [PH] (ref 5–7.5)
PLATELET # BLD AUTO: 176 X10E3/UL (ref 150–450)
POTASSIUM SERPL-SCNC: 4.6 MMOL/L (ref 3.5–5.2)
PROT SERPL-MCNC: 7.2 G/DL (ref 6–8.5)
PROT UR QL STRIP: NEGATIVE
PSA SERPL-MCNC: 2.3 NG/ML (ref 0–4)
RBC # BLD AUTO: 5.12 X10E6/UL (ref 4.14–5.8)
RBC #/AREA URNS HPF: NORMAL /HPF (ref 0–2)
SODIUM SERPL-SCNC: 139 MMOL/L (ref 134–144)
SP GR UR STRIP: 1.01 (ref 1–1.03)
TRIGL SERPL-MCNC: 120 MG/DL (ref 0–149)
TSH SERPL DL<=0.005 MIU/L-ACNC: 1.32 UIU/ML (ref 0.45–4.5)
URINALYSIS REFLEX: NORMAL
UROBILINOGEN UR STRIP-MCNC: 0.2 MG/DL (ref 0.2–1)
VLDLC SERPL CALC-MCNC: 21 MG/DL (ref 5–40)
WBC # BLD AUTO: 4.9 X10E3/UL (ref 3.4–10.8)
WBC #/AREA URNS HPF: NORMAL /HPF (ref 0–5)

## 2024-02-29 NOTE — TELEPHONE ENCOUNTER
----- Message from Yeyo Lacey MD sent at 2/29/2024  3:40 PM EST -----  Please speak the patient regarding laboratory investigations as obtained 2/28/2024.  Notable results as follows:    Total cholesterol 99, triglyceride 220, HDL 52, .  Comparison 1 year prior with total cholesterol 191, triglycerides 121, HDL 50, .  Overall stable and good cholesterol pattern.  Continue healthy diet and exercise with pursuit of weight loss.  CMP with notable glucose 95, good kidney function with creatinine 0.99, GFR 88.  Normal electrolytes, proteins.  Liver function test notable for only a slight elevation of ALT at 54 with upper limit of normal 44, where this was completely normal a year prior.  Please see discussion below regarding this.  PSA normal 2.3, previous 2.4-year prior.  TSH normal at 1.320.  CBC with differential with normal blood counts.  Urinalysis negative and nonconcerning.    Related to slight increase of ALT, although this is most likely consider with hepatic steatosis/fatty liver disease, for cautionary reasons I would like him to come by convenience in the next week or 2 to recheck an acute and chronic hepatitis panel which could also be a cause, a screen for something called hemochromatosis which is a blood test, and we will check a right upper quadrant ultrasound through [facility].  Blood test in our system for him to complete at soonest convenience and ultrasound ordered through [facility].  Advise any questions.

## 2024-03-04 ENCOUNTER — TELEPHONE (OUTPATIENT)
Dept: FAMILY MEDICINE CLINIC | Facility: CLINIC | Age: 59
End: 2024-03-04
Payer: COMMERCIAL

## 2024-03-04 NOTE — TELEPHONE ENCOUNTER
----- Message from Yeyo Lacey MD sent at 2/29/2024  3:40 PM EST -----  Please speak the patient regarding laboratory investigations as obtained 2/28/2024.  Notable results as follows:    Total cholesterol 99, triglyceride 220, HDL 52, .  Comparison 1 year prior with total cholesterol 191, triglycerides 121, HDL 50, .  Overall stable and good cholesterol pattern.  Continue healthy diet and exercise with pursuit of weight loss.  CMP with notable glucose 95, good kidney function with creatinine 0.99, GFR 88.  Normal electrolytes, proteins.  Liver function test notable for only a slight elevation of ALT at 54 with upper limit of normal 44, where this was completely normal a year prior.  Please see discussion below regarding this.  PSA normal 2.3, previous 2.4-year prior.  TSH normal at 1.320.  CBC with differential with normal blood counts.  Urinalysis negative and nonconcerning.    Related to slight increase of ALT, although this is most likely consider with hepatic steatosis/fatty liver disease, for cautionary reasons I would like him to come by convenience in the next week or 2 to recheck an acute and chronic hepatitis panel which could also be a cause, a screen for something called hemochromatosis which is a blood test, and we will check a right upper quadrant ultrasound through [facility].  Blood test in our system for him to complete at soonest convenience and ultrasound ordered through [facility].  Advise any questions.

## 2024-03-04 NOTE — TELEPHONE ENCOUNTER
I have called patient he would like for me to speak to his daughter as well since his english is not perfect. I will wait on her call.

## 2024-03-04 NOTE — TELEPHONE ENCOUNTER
Daughter has called back I have explained the results and she will have him come back in 2 weeks for retesting however. I am unsure if everything was communicated well she was experiencing loud background noise from her work.

## 2024-03-08 ENCOUNTER — TELEPHONE (OUTPATIENT)
Dept: FAMILY MEDICINE CLINIC | Facility: CLINIC | Age: 59
End: 2024-03-08
Payer: COMMERCIAL

## 2024-03-08 DIAGNOSIS — R94.5 ABNORMAL LIVER FUNCTION: ICD-10-CM

## 2024-03-08 NOTE — TELEPHONE ENCOUNTER
----- Message from Yeyo Lacey MD sent at 3/8/2024  2:40 PM EST -----  Please advise patient results of the ultrasound the liver as obtained on 3/8/2024 at [facility] related to increased liver function test, showing findings of hepatic steatosis which is the medical term for fatty liver disease.  No other abnormal findings noted.  He still needs to have the additional blood work obtained for the same increased liver function test, as I had discussed, which she can come by the office at any time to obtain.  Please do at soonest convenience.

## 2024-04-14 DIAGNOSIS — N52.9 VASCULOGENIC ERECTILE DYSFUNCTION, UNSPECIFIED VASCULOGENIC ERECTILE DYSFUNCTION TYPE: ICD-10-CM

## 2024-04-15 RX ORDER — SILDENAFIL 100 MG/1
100 TABLET, FILM COATED ORAL DAILY PRN
Qty: 9 TABLET | Refills: 0 | Status: SHIPPED | OUTPATIENT
Start: 2024-04-15

## 2024-05-16 DIAGNOSIS — N52.9 VASCULOGENIC ERECTILE DYSFUNCTION, UNSPECIFIED VASCULOGENIC ERECTILE DYSFUNCTION TYPE: ICD-10-CM

## 2024-05-16 RX ORDER — SILDENAFIL 100 MG/1
100 TABLET, FILM COATED ORAL DAILY PRN
Qty: 9 TABLET | Refills: 2 | Status: SHIPPED | OUTPATIENT
Start: 2024-05-16

## 2024-08-17 DIAGNOSIS — N52.9 VASCULOGENIC ERECTILE DYSFUNCTION, UNSPECIFIED VASCULOGENIC ERECTILE DYSFUNCTION TYPE: ICD-10-CM

## 2024-08-19 RX ORDER — SILDENAFIL 100 MG/1
100 TABLET, FILM COATED ORAL DAILY PRN
Qty: 9 TABLET | Refills: 0 | Status: SHIPPED | OUTPATIENT
Start: 2024-08-19

## 2024-08-19 NOTE — TELEPHONE ENCOUNTER
Patient due for follow up I have called him he will call back for appointment will give one month.